# Patient Record
Sex: FEMALE | Race: WHITE | Employment: FULL TIME | URBAN - METROPOLITAN AREA
[De-identification: names, ages, dates, MRNs, and addresses within clinical notes are randomized per-mention and may not be internally consistent; named-entity substitution may affect disease eponyms.]

---

## 2022-03-18 PROBLEM — R79.89 LOW VITAMIN D LEVEL: Status: ACTIVE | Noted: 2018-08-03

## 2022-03-19 PROBLEM — N39.3 STRESS INCONTINENCE (FEMALE) (MALE): Status: ACTIVE | Noted: 2017-11-02

## 2022-03-19 PROBLEM — E53.8 LOW VITAMIN B12 LEVEL: Status: ACTIVE | Noted: 2018-08-07

## 2023-09-05 ENCOUNTER — OFFICE VISIT (OUTPATIENT)
Age: 53
End: 2023-09-05

## 2023-09-05 VITALS
RESPIRATION RATE: 18 BRPM | SYSTOLIC BLOOD PRESSURE: 176 MMHG | HEART RATE: 83 BPM | OXYGEN SATURATION: 97 % | WEIGHT: 144.6 LBS | DIASTOLIC BLOOD PRESSURE: 94 MMHG | BODY MASS INDEX: 24.69 KG/M2 | HEIGHT: 64 IN | TEMPERATURE: 98.3 F

## 2023-09-05 DIAGNOSIS — R00.2 HEART PALPITATIONS: ICD-10-CM

## 2023-09-05 DIAGNOSIS — R03.0 ELEVATED BLOOD PRESSURE READING: Primary | ICD-10-CM

## 2023-09-05 PROCEDURE — 99213 OFFICE O/P EST LOW 20 MIN: CPT

## 2023-09-05 PROCEDURE — 3077F SYST BP >= 140 MM HG: CPT

## 2023-09-05 NOTE — PATIENT INSTRUCTIONS
- Keep a diary of blood pressures at rest for one week morning, noon, and bedtime   - Follow-up with PCP or in clinic for Hypertension work up  - Stress management like deep breathing or yoga  - OTC Melatonin to assist with sleep

## 2023-09-05 NOTE — PROGRESS NOTES
Chief Complaint   Patient presents with    Fever     Had this morning. Missed work. Tachycardia     History of HTN. Stress and anxiety. HISTORY OF PRESENT ILLNESS  Fartun Mathis is a 52 y.o. female. Patient reports that last night she had a fever of 100.2 that has subsided and without any other symptoms. She reports having palpitations on occasion over the last few weeks. She reports checking her blood pressure at [facility] via automated cuff last night and it was elevated. She denies SOB, difficulty, chest pain, or bounding/pounding of heart. She reports that she is under a very large amount of stress having gone through a divorce, selling her home and moving, and also a strained relationship with her daughter. She also reports a history of ETOH abuse and that she no longer abuses alcohol. She reports difficulty with sleep patterns as well. She reports an extensive family history with parents and siblings having hypertension and cardiac issues. She is concerned she may be have a problem as well. Review of Systems   Cardiovascular:  Positive for palpitations. Psychiatric/Behavioral:  Positive for sleep disturbance. All other systems reviewed and are negative. Physical Exam  Vitals reviewed. Constitutional:       Appearance: Normal appearance. HENT:      Head: Normocephalic. Right Ear: Tympanic membrane and ear canal normal.      Left Ear: Tympanic membrane and ear canal normal.      Nose: Nose normal.      Mouth/Throat:      Mouth: Mucous membranes are moist.      Pharynx: Oropharynx is clear. Eyes:      Extraocular Movements: Extraocular movements intact. Pupils: Pupils are equal, round, and reactive to light. Cardiovascular:      Rate and Rhythm: Normal rate and regular rhythm. Pulses: Normal pulses. Heart sounds: Normal heart sounds. Pulmonary:      Effort: Pulmonary effort is normal.      Breath sounds: Normal breath sounds.    Abdominal:      General: Bowel

## 2023-09-07 ENCOUNTER — OFFICE VISIT (OUTPATIENT)
Age: 53
End: 2023-09-07

## 2023-09-07 VITALS
OXYGEN SATURATION: 97 % | HEART RATE: 81 BPM | RESPIRATION RATE: 18 BRPM | HEIGHT: 64 IN | DIASTOLIC BLOOD PRESSURE: 91 MMHG | SYSTOLIC BLOOD PRESSURE: 167 MMHG | BODY MASS INDEX: 24.59 KG/M2 | WEIGHT: 144 LBS

## 2023-09-07 DIAGNOSIS — I10 HYPERTENSION, UNSPECIFIED TYPE: Primary | ICD-10-CM

## 2023-09-07 DIAGNOSIS — F41.1 GENERALIZED ANXIETY DISORDER: ICD-10-CM

## 2023-09-07 PROCEDURE — 3077F SYST BP >= 140 MM HG: CPT | Performed by: NURSE PRACTITIONER

## 2023-09-07 PROCEDURE — 99213 OFFICE O/P EST LOW 20 MIN: CPT | Performed by: NURSE PRACTITIONER

## 2023-09-07 RX ORDER — PROPRANOLOL HYDROCHLORIDE 40 MG/1
40 TABLET ORAL 2 TIMES DAILY
Qty: 60 TABLET | Refills: 0 | Status: SHIPPED | OUTPATIENT
Start: 2023-09-07 | End: 2023-10-07

## 2023-09-07 NOTE — PROGRESS NOTES
Chief Complaint   Patient presents with    Blood Pressure Check     Was seen 9/5/2023 for elevated blood pressure.          Vitals:    09/07/23 1738   BP: (!) 167/91   Pulse: 81   Resp: 18   SpO2: 97%
BP's better than in office today, hypertensive. Will treat both htn and anxiety with new medication. Discussed mechanism of action, common side effects, and adverse effects. Advised to continue therapist and see PMH NP as desired. Instructed to follow up in office in two weeks for BP/anxiety, consider bridging prescription to PCP appointment if doing well.       [address] [facility] [place], APRN - NP

## 2023-09-28 ENCOUNTER — OFFICE VISIT (OUTPATIENT)
Age: 53
End: 2023-09-28

## 2023-09-28 VITALS
SYSTOLIC BLOOD PRESSURE: 148 MMHG | WEIGHT: 144 LBS | BODY MASS INDEX: 24.59 KG/M2 | OXYGEN SATURATION: 98 % | HEART RATE: 53 BPM | HEIGHT: 64 IN | DIASTOLIC BLOOD PRESSURE: 89 MMHG | RESPIRATION RATE: 16 BRPM

## 2023-09-28 DIAGNOSIS — I10 PRIMARY HYPERTENSION: Primary | ICD-10-CM

## 2023-09-28 DIAGNOSIS — H93.12 LEFT-SIDED TINNITUS: ICD-10-CM

## 2023-09-28 NOTE — PROGRESS NOTES
Chief Complaint   Patient presents with    Blood Pressure Check    Ear Fullness     Left ear feels clogged.          Vitals:    09/28/23 1808   BP: (!) 148/89   Pulse: 53   Resp: 16   SpO2: 98%

## 2023-09-28 NOTE — PROGRESS NOTES
Chief Complaint   Patient presents with    Blood Pressure Check    Ear Fullness     Left ear feels clogged. HISTORY OF PRESENT ILLNESS  Fartun Lockwood is a 52 y.o. female. Patient reports that since starting Propanolol she has had significant decrease in BP to where it was 103/60 \"something\" and she was symptomatic with light headedness and lethargy. She cut it back to once daily at bedtime with pressures now running around 117/78 and without symptoms. She also reports seeing a therapist and that she is doing much better emotionally and feels less stressed. She reports that she has a feeling of fullness or tunneling in her left ear and would like it to be checked. She denies any other symptoms at this time. Review of Systems   HENT:  Positive for tinnitus. All other systems reviewed and are negative. Physical Exam  Constitutional:       Appearance: Normal appearance. HENT:      Head: Normocephalic and atraumatic. Right Ear: Tympanic membrane and ear canal normal.      Left Ear: Tympanic membrane and ear canal normal.   Neurological:      Mental Status: She is alert. Past Medical History:   Diagnosis Date    Hot flashes     HTN (hypertension)     Substance abuse ([address])      Past Surgical History:   Procedure Laterality Date    BREAST SURGERY      left breast marker    GYN      anterior-posterior repair    HEENT      wisdom teeth extraction    HYSTERECTOMY (CERVIX STATUS UNKNOWN)      [facility] STEROT[facility] LOC BREAST BIOPSY LEFT Left 2013       Vitals:    09/28/23 1808   BP: (!) 148/89   Pulse: 53   Resp: 16   SpO2: 98%       No results found for any visits on 09/28/23. ASSESSMENT and PLAN    1. Primary hypertension  Initial PCP is in November. Recommended to keep taking Propanolol once daily as Blood pressures are significantly better. Advised that she may call office if a refill is needed before she is able to get in with her PCP. Discussed DASH diet.     2. Left-sided tinnitus  Over the

## 2023-09-28 NOTE — PATIENT INSTRUCTIONS
Reduce Propanolol to once daily at bedtime. May call for refill if needed before PCP Appt. In November.